# Patient Record
Sex: FEMALE | Race: WHITE
[De-identification: names, ages, dates, MRNs, and addresses within clinical notes are randomized per-mention and may not be internally consistent; named-entity substitution may affect disease eponyms.]

---

## 2021-03-18 ENCOUNTER — HOSPITAL ENCOUNTER (EMERGENCY)
Dept: HOSPITAL 4 - SED | Age: 76
Discharge: HOME | End: 2021-03-18
Payer: COMMERCIAL

## 2021-03-18 VITALS — HEIGHT: 62 IN | BODY MASS INDEX: 36.8 KG/M2 | WEIGHT: 200 LBS

## 2021-03-18 VITALS — SYSTOLIC BLOOD PRESSURE: 127 MMHG

## 2021-03-18 VITALS — SYSTOLIC BLOOD PRESSURE: 156 MMHG

## 2021-03-18 DIAGNOSIS — R10.84: Primary | ICD-10-CM

## 2021-03-18 LAB
ALBUMIN SERPL BCP-MCNC: 4 G/DL (ref 3.4–4.8)
ALT SERPL W P-5'-P-CCNC: 40 U/L (ref 12–78)
ANION GAP SERPL CALCULATED.3IONS-SCNC: 9 MMOL/L (ref 5–15)
AST SERPL W P-5'-P-CCNC: 30 U/L (ref 10–37)
BASOPHILS # BLD AUTO: 0 K/UL (ref 0–0.2)
BASOPHILS NFR BLD AUTO: 0.7 % (ref 0–2)
BILIRUB SERPL-MCNC: 0.4 MG/DL (ref 0–1)
BUN SERPL-MCNC: 9 MG/DL (ref 8–21)
CALCIUM SERPL-MCNC: 10.3 MG/DL (ref 8.4–11)
CHLORIDE SERPL-SCNC: 97 MMOL/L (ref 98–107)
CK MB SERPL-CCNC: 2.5 NG/ML (ref 0–3.6)
CK MB SERPL-RTO: 1 % (ref 0–2.9)
CREAT SERPL-MCNC: 0.99 MG/DL (ref 0.55–1.3)
EOSINOPHIL # BLD AUTO: 0 K/UL (ref 0–0.4)
EOSINOPHIL NFR BLD AUTO: 0.1 % (ref 0–4)
ERYTHROCYTE [DISTWIDTH] IN BLOOD BY AUTOMATED COUNT: 13.4 % (ref 9–15)
GFR SERPL CREATININE-BSD FRML MDRD: (no result) ML/MIN (ref 90–?)
GLUCOSE SERPL-MCNC: 113 MG/DL (ref 70–99)
HCT VFR BLD AUTO: 42.7 % (ref 36–48)
HGB BLD-MCNC: 14.4 G/DL (ref 12–16)
LYMPHOCYTES # BLD AUTO: 0.5 K/UL (ref 1–5.5)
LYMPHOCYTES NFR BLD AUTO: 8.4 % (ref 20.5–51.5)
MCH RBC QN AUTO: 27 PG (ref 27–31)
MCHC RBC AUTO-ENTMCNC: 34 % (ref 32–36)
MCV RBC AUTO: 81 FL (ref 79–98)
MONOCYTES # BLD MANUAL: 0.4 K/UL (ref 0–1)
MONOCYTES # BLD MANUAL: 6.1 % (ref 1.7–9.3)
NEUTROPHILS # BLD AUTO: 5.5 K/UL (ref 1.8–7.7)
NEUTROPHILS NFR BLD AUTO: 84.7 % (ref 40–70)
PLATELET # BLD AUTO: 210 K/UL (ref 130–430)
POTASSIUM SERPL-SCNC: 3.4 MMOL/L (ref 3.5–5.1)
RBC # BLD AUTO: 5.29 MIL/UL (ref 4.2–6.2)
SODIUM SERPLBLD-SCNC: 136 MMOL/L (ref 136–145)
WBC # BLD AUTO: 6.5 K/UL (ref 4.8–10.8)

## 2021-03-18 PROCEDURE — 85025 COMPLETE CBC W/AUTO DIFF WBC: CPT

## 2021-03-18 PROCEDURE — 82553 CREATINE MB FRACTION: CPT

## 2021-03-18 PROCEDURE — 71045 X-RAY EXAM CHEST 1 VIEW: CPT

## 2021-03-18 PROCEDURE — 80053 COMPREHEN METABOLIC PANEL: CPT

## 2021-03-18 PROCEDURE — 84484 ASSAY OF TROPONIN QUANT: CPT

## 2021-03-18 PROCEDURE — 96374 THER/PROPH/DIAG INJ IV PUSH: CPT

## 2021-03-18 PROCEDURE — 82962 GLUCOSE BLOOD TEST: CPT

## 2021-03-18 PROCEDURE — 83880 ASSAY OF NATRIURETIC PEPTIDE: CPT

## 2021-03-18 PROCEDURE — 93005 ELECTROCARDIOGRAM TRACING: CPT

## 2021-03-18 PROCEDURE — 82550 ASSAY OF CK (CPK): CPT

## 2021-03-18 PROCEDURE — 96361 HYDRATE IV INFUSION ADD-ON: CPT

## 2021-03-18 PROCEDURE — 99285 EMERGENCY DEPT VISIT HI MDM: CPT

## 2021-03-18 PROCEDURE — 36415 COLL VENOUS BLD VENIPUNCTURE: CPT

## 2021-03-18 NOTE — NUR
Patient to be transferred to [facility].  Is being transferred due to 
higher level of care.  Receiving facility has accepting physician and available 
space. ER physician has signed transfer form.  Patient or responsible party has 
agreed to transfer and signed form.  Patient belongings inventoried and will be 
sent with patient.  Copy of nursing notes, lab reports, EKG, Physicians Orders 
and X-rays to be sent with patient.  Report called to Rah at receiving 
facility. Receiving physician is Ned. [facility] ambulance service has been called 
for transfer.

## 2021-03-18 NOTE — NUR
**TRANSFER INFO**

[facility] [place] [address]

[phone]

ALS ETA [id number]



Spoke to Maribell

## 2021-03-18 NOTE — NUR
Pt currently resting in bed, denies any pain at this time. Respirations 
unlabored, chest equal rise and fall. In no distress. In stable condition. 

Pending possible [place] transfer.

## 2021-03-18 NOTE — NUR
Pt BIB by ambulance for a syncopal episode. Pt stated that she had bilateral 
upper abdominal pain since Friday and felt dizzy since this this morning. Pt 
stated that she felt that this morning she is feeling dizzy and only ate soup. 
Currently feels that her blood sugar is low. Denies any nausea at this time. 

Pending MD sweeney.

## 2022-03-01 ENCOUNTER — HOSPITAL ENCOUNTER (EMERGENCY)
Dept: HOSPITAL 4 - SED | Age: 77
Discharge: HOME | End: 2022-03-01
Payer: MEDICARE

## 2022-03-01 VITALS — SYSTOLIC BLOOD PRESSURE: 135 MMHG

## 2022-03-01 VITALS — SYSTOLIC BLOOD PRESSURE: 112 MMHG

## 2022-03-01 VITALS — BODY MASS INDEX: 19.29 KG/M2 | HEIGHT: 66 IN | WEIGHT: 120 LBS

## 2022-03-01 DIAGNOSIS — E87.6: Primary | ICD-10-CM

## 2022-03-01 DIAGNOSIS — K59.00: ICD-10-CM

## 2022-03-01 DIAGNOSIS — I10: ICD-10-CM

## 2022-03-01 DIAGNOSIS — Z20.822: ICD-10-CM

## 2022-03-01 DIAGNOSIS — F32.9: ICD-10-CM

## 2022-03-01 LAB
ALBUMIN SERPL BCP-MCNC: 2.9 G/DL (ref 3.4–4.8)
ALT SERPL W P-5'-P-CCNC: 18 U/L (ref 12–78)
ANION GAP SERPL CALCULATED.3IONS-SCNC: 8 MMOL/L (ref 5–15)
APPEARANCE UR: (no result)
AST SERPL W P-5'-P-CCNC: 20 U/L (ref 10–37)
BACTERIA URNS QL MICRO: (no result) /HPF
BASOPHILS # BLD AUTO: 0.1 K/UL (ref 0–0.2)
BASOPHILS NFR BLD AUTO: 1.3 % (ref 0–2)
BILIRUB SERPL-MCNC: 0.2 MG/DL (ref 0–1)
BILIRUB UR QL STRIP: NEGATIVE
BUN SERPL-MCNC: 20 MG/DL (ref 8–21)
CALCIUM SERPL-MCNC: 9 MG/DL (ref 8.4–11)
CHLORIDE SERPL-SCNC: 94 MMOL/L (ref 98–107)
COLOR UR: YELLOW
CREAT SERPL-MCNC: 0.75 MG/DL (ref 0.55–1.3)
EOSINOPHIL # BLD AUTO: 0 K/UL (ref 0–0.4)
EOSINOPHIL NFR BLD AUTO: 0.2 % (ref 0–4)
ERYTHROCYTE [DISTWIDTH] IN BLOOD BY AUTOMATED COUNT: 14.4 % (ref 9–15)
GFR SERPL CREATININE-BSD FRML MDRD: (no result) ML/MIN (ref 90–?)
GLUCOSE SERPL-MCNC: 121 MG/DL (ref 70–99)
GLUCOSE UR STRIP-MCNC: NEGATIVE MG/DL
HCT VFR BLD AUTO: 37.2 % (ref 36–48)
HGB BLD-MCNC: 12.5 G/DL (ref 12–16)
HGB UR QL STRIP: (no result)
KETONES UR STRIP-MCNC: NEGATIVE MG/DL
LEUKOCYTE ESTERASE UR QL STRIP: (no result)
LIPASE SERPL-CCNC: 170 U/L (ref 73–393)
LYMPHOCYTES # BLD AUTO: 1 K/UL (ref 1–5.5)
LYMPHOCYTES NFR BLD AUTO: 14 % (ref 20.5–51.5)
MCH RBC QN AUTO: 27 PG (ref 27–31)
MCHC RBC AUTO-ENTMCNC: 34 % (ref 32–36)
MCV RBC AUTO: 80 FL (ref 79–98)
MONOCYTES # BLD MANUAL: 0.3 K/UL (ref 0–1)
MONOCYTES # BLD MANUAL: 4.4 % (ref 1.7–9.3)
NEUTROPHILS # BLD AUTO: 5.5 K/UL (ref 1.8–7.7)
NEUTROPHILS NFR BLD AUTO: 80.1 % (ref 40–70)
NITRITE UR QL STRIP: NEGATIVE
PH UR STRIP: 5.5 [PH] (ref 5–8)
PLATELET # BLD AUTO: 199 K/UL (ref 130–430)
POTASSIUM SERPL-SCNC: 2.7 MMOL/L (ref 3.5–5.1)
PROT UR QL STRIP: NEGATIVE
RBC # BLD AUTO: 4.68 MIL/UL (ref 4.2–6.2)
SODIUM SERPLBLD-SCNC: 132 MMOL/L (ref 136–145)
SP GR UR STRIP: 1.01 (ref 1–1.03)
UROBILINOGEN UR STRIP-MCNC: 0.2 MG/DL (ref 0.2–1)
WBC # BLD AUTO: 6.9 K/UL (ref 4.8–10.8)

## 2022-03-01 PROCEDURE — 74021 RADEX ABDOMEN 3+ VIEWS: CPT

## 2022-03-01 PROCEDURE — 83735 ASSAY OF MAGNESIUM: CPT

## 2022-03-01 PROCEDURE — 96365 THER/PROPH/DIAG IV INF INIT: CPT

## 2022-03-01 PROCEDURE — 93005 ELECTROCARDIOGRAM TRACING: CPT

## 2022-03-01 PROCEDURE — 87426 SARSCOV CORONAVIRUS AG IA: CPT

## 2022-03-01 PROCEDURE — 36415 COLL VENOUS BLD VENIPUNCTURE: CPT

## 2022-03-01 PROCEDURE — 96372 THER/PROPH/DIAG INJ SC/IM: CPT

## 2022-03-01 PROCEDURE — 99285 EMERGENCY DEPT VISIT HI MDM: CPT

## 2022-03-01 PROCEDURE — 85025 COMPLETE CBC W/AUTO DIFF WBC: CPT

## 2022-03-01 PROCEDURE — 83690 ASSAY OF LIPASE: CPT

## 2022-03-01 PROCEDURE — 87086 URINE CULTURE/COLONY COUNT: CPT

## 2022-03-01 PROCEDURE — 81000 URINALYSIS NONAUTO W/SCOPE: CPT

## 2022-03-01 PROCEDURE — 80053 COMPREHEN METABOLIC PANEL: CPT

## 2022-04-18 ENCOUNTER — HOSPITAL ENCOUNTER (EMERGENCY)
Dept: HOSPITAL 4 - SED | Age: 77
LOS: 1 days | Discharge: HOME | End: 2022-04-19
Payer: MEDICARE

## 2022-04-18 VITALS — SYSTOLIC BLOOD PRESSURE: 137 MMHG

## 2022-04-18 VITALS — WEIGHT: 98 LBS | BODY MASS INDEX: 17.36 KG/M2 | HEIGHT: 63 IN

## 2022-04-18 DIAGNOSIS — F41.9: ICD-10-CM

## 2022-04-18 DIAGNOSIS — I20.9: Primary | ICD-10-CM

## 2022-04-18 DIAGNOSIS — I10: ICD-10-CM

## 2022-04-18 LAB
ALBUMIN SERPL BCP-MCNC: 3.1 G/DL (ref 3.4–4.8)
ALT SERPL W P-5'-P-CCNC: 21 U/L (ref 12–78)
ANION GAP SERPL CALCULATED.3IONS-SCNC: 3 MMOL/L (ref 5–15)
AST SERPL W P-5'-P-CCNC: 14 U/L (ref 10–37)
BASOPHILS # BLD AUTO: 0 K/UL (ref 0–0.2)
BASOPHILS NFR BLD AUTO: 1.2 % (ref 0–2)
BILIRUB SERPL-MCNC: < 0.1 MG/DL (ref 0–1)
BUN SERPL-MCNC: 17 MG/DL (ref 8–21)
CALCIUM SERPL-MCNC: 9.9 MG/DL (ref 8.4–11)
CHLORIDE SERPL-SCNC: 95 MMOL/L (ref 98–107)
CREAT SERPL-MCNC: 0.72 MG/DL (ref 0.55–1.3)
EOSINOPHIL # BLD AUTO: 0 K/UL (ref 0–0.4)
EOSINOPHIL NFR BLD AUTO: 0.5 % (ref 0–4)
ERYTHROCYTE [DISTWIDTH] IN BLOOD BY AUTOMATED COUNT: 14.1 % (ref 9–15)
GFR SERPL CREATININE-BSD FRML MDRD: (no result) ML/MIN (ref 90–?)
GLUCOSE SERPL-MCNC: 107 MG/DL (ref 70–99)
HCT VFR BLD AUTO: 35.2 % (ref 36–48)
HGB BLD-MCNC: 11.9 G/DL (ref 12–16)
LYMPHOCYTES # BLD AUTO: 1.1 K/UL (ref 1–5.5)
LYMPHOCYTES NFR BLD AUTO: 26.6 % (ref 20.5–51.5)
MCH RBC QN AUTO: 28 PG (ref 27–31)
MCHC RBC AUTO-ENTMCNC: 34 % (ref 32–36)
MCV RBC AUTO: 81 FL (ref 79–98)
MONOCYTES # BLD MANUAL: 0.3 K/UL (ref 0–1)
MONOCYTES # BLD MANUAL: 7.6 % (ref 1.7–9.3)
NEUTROPHILS # BLD AUTO: 2.6 K/UL (ref 1.8–7.7)
NEUTROPHILS NFR BLD AUTO: 64.1 % (ref 40–70)
PLATELET # BLD AUTO: 199 K/UL (ref 130–430)
POTASSIUM SERPL-SCNC: 3.5 MMOL/L (ref 3.5–5.1)
RBC # BLD AUTO: 4.33 MIL/UL (ref 4.2–6.2)
SODIUM SERPLBLD-SCNC: 131 MMOL/L (ref 136–145)
WBC # BLD AUTO: 4 K/UL (ref 4.8–10.8)

## 2022-04-18 PROCEDURE — 93005 ELECTROCARDIOGRAM TRACING: CPT

## 2022-04-18 PROCEDURE — 96361 HYDRATE IV INFUSION ADD-ON: CPT

## 2022-04-18 PROCEDURE — 36415 COLL VENOUS BLD VENIPUNCTURE: CPT

## 2022-04-18 PROCEDURE — 72125 CT NECK SPINE W/O DYE: CPT

## 2022-04-18 PROCEDURE — 85025 COMPLETE CBC W/AUTO DIFF WBC: CPT

## 2022-04-18 PROCEDURE — 70450 CT HEAD/BRAIN W/O DYE: CPT

## 2022-04-18 PROCEDURE — 99285 EMERGENCY DEPT VISIT HI MDM: CPT

## 2022-04-18 PROCEDURE — 76376 3D RENDER W/INTRP POSTPROCES: CPT

## 2022-04-18 PROCEDURE — 71045 X-RAY EXAM CHEST 1 VIEW: CPT

## 2022-04-18 PROCEDURE — 83880 ASSAY OF NATRIURETIC PEPTIDE: CPT

## 2022-04-18 PROCEDURE — 80053 COMPREHEN METABOLIC PANEL: CPT

## 2022-04-18 PROCEDURE — 82962 GLUCOSE BLOOD TEST: CPT

## 2022-04-18 PROCEDURE — 96374 THER/PROPH/DIAG INJ IV PUSH: CPT

## 2022-04-18 PROCEDURE — 84484 ASSAY OF TROPONIN QUANT: CPT

## 2022-04-18 NOTE — NUR
PT BIBA FROM HOME FOR SUDDEN ONSET CHEST WALL PAIN THAT STARTED 1 HR PTA. PER 
REPORT , PT SELF MEDICATED WITH 3 NITRO WITH MODERATE RELIEF. PER REPORT SHE 
WAS JUST DISCHARGED FROM [facility] FOR SAME CHEST PAIN COMPLAINT. PT CURRENTLY IN 
RESTROOM, COLLECTING URINE. WAIITNG FOR ER MD MOYA.

## 2022-04-18 NOTE — NUR
PT REPORTS SOB AND "BREATHING FAST" PT IN NO APPARENT DISTRESS, ON RA @100%. 
RESP EVEN AND UNLABORED, YSABEL LS-CLR. DENIES CP OR PRESSURE AT THIS TIME. 
REPORTS FEELING NERVOUS. REQUESTING IV FLUIDS BECAUSE SHE'S TIRED. DR BECKMAN MADE 
AWARE. PLAN OF CARE INFORMED.

## 2022-04-18 NOTE — NUR
PT UP FOR DISCHARGE, PT ASLEEP, RESPONDED TO TACTILE STIMULATION. RESP EVEN AND 
UNLABORED, ON RA @97%. CN RACHEAL INFORMED. WILL HAVE PT SLEEP AT THIS TIME.

## 2022-04-18 NOTE — NUR
PT WITH EYES CLOSED, IN NAD. RESP EVEN AND UNLBAORED, IV FLUIDS INFUSING WELL. 
SAFETY PRCAUTIONS IN PLACE.

## 2022-04-19 VITALS — SYSTOLIC BLOOD PRESSURE: 125 MMHG

## 2022-04-19 NOTE — NUR
Patient given written and verbal discharge instructions and verbalizes 
understanding.  ER MD discussed with patient the results and treatment 
provided. Patient in stable condition. ID arm band removed. IV catheter removed 
intact and dressing applied, no active bleeding. Patient educated on pain 
management and to follow up with PMD. Pain scale 0/10. Opportunity for 
questions provided and answered.

## 2022-04-19 NOTE — NUR
DR WALLACE SPOKE WITH [facility] MD, PT WILL STAY IN ER. PT WITH EYES CLOSED, IN 
NAD RESP EVEN AND UNLABORED, ON RA @98%. SAFETY PRECAUTIONS IN PLACE.

## 2022-04-19 NOTE — NUR
-------------------------------------------------------------------------------

           *** Note antwanone in EDM - 04/19/22 at 0254 by SDEDCJM ***            

-------------------------------------------------------------------------------

LOUD THUMP HEARD FROM NURSING STATION.   THIS RN RAN TO ROOM FOUND PATIENT ON 
FLOOR LAYING ON LEFT SIDE  BY FEET OF BED WITH HER LEFT HAND HOLDING THE BACK 
OF HER HEAD AND NECK. BOTH [facility] SIDE RAILS.  PATIENT STATES " I WANTED TO GET 
UP AND PEE."  DR WALLACE AT BEDSIDE. PATIENT ASSISTED TO RESTROOM  TO URINATE.   
PATIENT VOIDED AND ACCOMPANIED BACK TO BED WITH BOTH SIDE RAILS UP.  PATIENT 
HAS A BUMP TO BACK OF HEAD. IS UNABLE TO AMBULATE WITHOUT ASSIST.

## 2022-04-19 NOTE — NUR
LOUD THUMP HEARD FROM NURSING STATION.   THIS RN RAN TO ROOM FOUND PATIENT ON 
FLOOR LAYING ON LEFT SIDE  BY FEET OF BED WITH HER LEFT HAND HOLDING THE BACK 
OF HER HEAD AND NECK. BOTH GURNEY SIDE RAILS.  PATIENT STATES " I WANTED TO GET 
UP AND PEE."  DR WALLACE AT BEDSIDE. PATIENT ASSISTED TO RESTROOM  TO URINATE.   
PATIENT VOIDED AND ACCOMPANIED BACK TO BED WITH BOTH SIDE RAILS UP.  IS UNABLE 
TO AMBULATE WITHOUT ASSIST.   DENIES ANY PAIN

## 2022-04-19 NOTE — NUR
PT CALLED FOR ASSISTANCE, MIN ASSIST TO BATHROOM, STEADY GAIT. PT VOIDED, 
DENIES CP OR SOB. REQUSTS BREAKFAST IN TH E MORNING BEFORE GOING HOME. VSS. 
WILL CONT TO MONITOR. SAFETY PRECAUTIONS IN PLACE.

## 2022-07-27 ENCOUNTER — HOSPITAL ENCOUNTER (INPATIENT)
Dept: HOSPITAL 4 - SED | Age: 77
LOS: 8 days | Discharge: HOME | DRG: 690 | End: 2022-08-04
Payer: COMMERCIAL

## 2022-07-27 VITALS — SYSTOLIC BLOOD PRESSURE: 116 MMHG

## 2022-07-27 VITALS — SYSTOLIC BLOOD PRESSURE: 156 MMHG

## 2022-07-27 VITALS — WEIGHT: 94 LBS | BODY MASS INDEX: 16.66 KG/M2 | HEIGHT: 63 IN

## 2022-07-27 VITALS — SYSTOLIC BLOOD PRESSURE: 129 MMHG

## 2022-07-27 DIAGNOSIS — Z91.018: ICD-10-CM

## 2022-07-27 DIAGNOSIS — K21.9: ICD-10-CM

## 2022-07-27 DIAGNOSIS — K29.70: ICD-10-CM

## 2022-07-27 DIAGNOSIS — K59.09: ICD-10-CM

## 2022-07-27 DIAGNOSIS — Z86.79: ICD-10-CM

## 2022-07-27 DIAGNOSIS — R62.7: ICD-10-CM

## 2022-07-27 DIAGNOSIS — I10: ICD-10-CM

## 2022-07-27 DIAGNOSIS — E87.1: ICD-10-CM

## 2022-07-27 DIAGNOSIS — R73.03: ICD-10-CM

## 2022-07-27 DIAGNOSIS — N39.0: Primary | ICD-10-CM

## 2022-07-27 DIAGNOSIS — Z20.822: ICD-10-CM

## 2022-07-27 DIAGNOSIS — E87.8: ICD-10-CM

## 2022-07-27 DIAGNOSIS — E44.0: ICD-10-CM

## 2022-07-27 DIAGNOSIS — B95.1: ICD-10-CM

## 2022-07-27 LAB
ALBUMIN SERPL BCP-MCNC: 3.7 G/DL (ref 3.4–4.8)
ALT SERPL W P-5'-P-CCNC: 20 U/L (ref 12–78)
AMPHETAMINES UR QL SCN: NEGATIVE
ANION GAP SERPL CALCULATED.3IONS-SCNC: < 3 MMOL/L (ref 5–15)
APAP SERPL-MCNC: < 1 UG/ML (ref 1–30)
APPEARANCE UR: (no result)
AST SERPL W P-5'-P-CCNC: 19 U/L (ref 10–37)
BACTERIA URNS QL MICRO: (no result) /HPF
BARBITURATES UR QL SCN: NEGATIVE
BASOPHILS # BLD AUTO: 0 K/UL (ref 0–0.2)
BASOPHILS NFR BLD AUTO: 0.8 % (ref 0–2)
BENZODIAZ UR QL SCN: NEGATIVE
BILIRUB SERPL-MCNC: 0.5 MG/DL (ref 0–1)
BILIRUB UR QL STRIP: NEGATIVE
BUN SERPL-MCNC: 15 MG/DL (ref 8–21)
BZE UR QL SCN: NEGATIVE
CALCIUM SERPL-MCNC: 10.1 MG/DL (ref 8.4–11)
CANNABINOIDS UR QL SCN: NEGATIVE
CHLORIDE SERPL-SCNC: 94 MMOL/L (ref 98–107)
COLOR UR: YELLOW
CREAT SERPL-MCNC: 0.72 MG/DL (ref 0.55–1.3)
EOSINOPHIL # BLD AUTO: 0 K/UL (ref 0–0.4)
EOSINOPHIL NFR BLD AUTO: 0.1 % (ref 0–4)
ERYTHROCYTE [DISTWIDTH] IN BLOOD BY AUTOMATED COUNT: 13.4 % (ref 9–15)
ETHANOL SERPL-MCNC: < 3 MG/DL (ref ?–10)
GFR SERPL CREATININE-BSD FRML MDRD: (no result) ML/MIN (ref 90–?)
GLUCOSE SERPL-MCNC: 100 MG/DL (ref 70–99)
GLUCOSE UR STRIP-MCNC: NEGATIVE MG/DL
HCT VFR BLD AUTO: 41.3 % (ref 36–48)
HGB BLD-MCNC: 14.1 G/DL (ref 12–16)
HGB UR QL STRIP: (no result)
INR PPP: 1.2 (ref 0.8–1.2)
KETONES UR STRIP-MCNC: NEGATIVE MG/DL
LEUKOCYTE ESTERASE UR QL STRIP: (no result)
LYMPHOCYTES # BLD AUTO: 0.6 K/UL (ref 1–5.5)
LYMPHOCYTES NFR BLD AUTO: 11.9 % (ref 20.5–51.5)
MCH RBC QN AUTO: 28 PG (ref 27–31)
MCHC RBC AUTO-ENTMCNC: 34 % (ref 32–36)
MCV RBC AUTO: 81 FL (ref 79–98)
METHADONE UR-SCNC: NEGATIVE UMOL/L
METHAMPHET UR-SCNC: NEGATIVE UMOL/L
MONOCYTES # BLD MANUAL: 0.2 K/UL (ref 0–1)
MONOCYTES # BLD MANUAL: 4.2 % (ref 1.7–9.3)
NEUTROPHILS # BLD AUTO: 4.4 K/UL (ref 1.8–7.7)
NEUTROPHILS NFR BLD AUTO: 83 % (ref 40–70)
NITRITE UR QL STRIP: NEGATIVE
OPIATES UR QL SCN: NEGATIVE
OXYCODONE SERPL-MCNC: NEGATIVE NG/ML
PCP UR QL SCN: NEGATIVE
PH UR STRIP: 7 [PH] (ref 5–8)
PLATELET # BLD AUTO: 198 K/UL (ref 130–430)
POTASSIUM SERPL-SCNC: 4.1 MMOL/L (ref 3.5–5.1)
PROT UR QL STRIP: NEGATIVE
PROTHROMBIN TIME: 12.3 SECS (ref 9.5–12.5)
RBC # BLD AUTO: 5.1 MIL/UL (ref 4.2–6.2)
SODIUM SERPLBLD-SCNC: 130 MMOL/L (ref 136–145)
SP GR UR STRIP: 1.01 (ref 1–1.03)
TRICYCLICS UR-MCNC: NEGATIVE NG/ML
URINE PROPOXYPHENE SCREEN: NEGATIVE
UROBILINOGEN UR STRIP-MCNC: 0.2 MG/DL (ref 0.2–1)
WBC # BLD AUTO: 5.3 K/UL (ref 4.8–10.8)
WBC #/AREA URNS HPF: (no result) /HPF (ref 0–3)

## 2022-07-27 PROCEDURE — G0481 DRUG TEST DEF 8-14 CLASSES: HCPCS

## 2022-07-27 PROCEDURE — G0482 DRUG TEST DEF 15-21 CLASSES: HCPCS

## 2022-07-27 PROCEDURE — G0480 DRUG TEST DEF 1-7 CLASSES: HCPCS

## 2022-07-27 PROCEDURE — C9113 INJ PANTOPRAZOLE SODIUM, VIA: HCPCS

## 2022-07-27 RX ADMIN — DEXTROSE MONOHYDRATE SCH MLS/HR: 50 INJECTION, SOLUTION INTRAVENOUS at 21:21

## 2022-07-27 RX ADMIN — ZOLPIDEM TARTRATE PRN MG: 5 TABLET, FILM COATED ORAL at 21:20

## 2022-07-27 RX ADMIN — ONDANSETRON PRN MG: 2 INJECTION INTRAMUSCULAR; INTRAVENOUS at 21:20

## 2022-07-28 VITALS — SYSTOLIC BLOOD PRESSURE: 111 MMHG

## 2022-07-28 VITALS — SYSTOLIC BLOOD PRESSURE: 108 MMHG

## 2022-07-28 VITALS — SYSTOLIC BLOOD PRESSURE: 117 MMHG

## 2022-07-28 VITALS — SYSTOLIC BLOOD PRESSURE: 118 MMHG

## 2022-07-28 LAB
ANION GAP SERPL CALCULATED.3IONS-SCNC: 1 MMOL/L (ref 5–15)
BASOPHILS # BLD AUTO: 0 K/UL (ref 0–0.2)
BASOPHILS NFR BLD AUTO: 1.1 % (ref 0–2)
BUN SERPL-MCNC: 16 MG/DL (ref 8–21)
CALCIUM SERPL-MCNC: 9.6 MG/DL (ref 8.4–11)
CHLORIDE SERPL-SCNC: 94 MMOL/L (ref 98–107)
CREAT SERPL-MCNC: 0.8 MG/DL (ref 0.55–1.3)
EOSINOPHIL # BLD AUTO: 0 K/UL (ref 0–0.4)
EOSINOPHIL NFR BLD AUTO: 0.4 % (ref 0–4)
ERYTHROCYTE [DISTWIDTH] IN BLOOD BY AUTOMATED COUNT: 13.3 % (ref 9–15)
GFR SERPL CREATININE-BSD FRML MDRD: (no result) ML/MIN (ref 90–?)
GLUCOSE SERPL-MCNC: 87 MG/DL (ref 70–99)
HCT VFR BLD AUTO: 36 % (ref 36–48)
HGB BLD-MCNC: 12.4 G/DL (ref 12–16)
LYMPHOCYTES # BLD AUTO: 0.8 K/UL (ref 1–5.5)
LYMPHOCYTES NFR BLD AUTO: 27 % (ref 20.5–51.5)
MCH RBC QN AUTO: 28 PG (ref 27–31)
MCHC RBC AUTO-ENTMCNC: 34 % (ref 32–36)
MCV RBC AUTO: 80 FL (ref 79–98)
MONOCYTES # BLD MANUAL: 0.2 K/UL (ref 0–1)
MONOCYTES # BLD MANUAL: 6.3 % (ref 1.7–9.3)
NEUTROPHILS # BLD AUTO: 2 K/UL (ref 1.8–7.7)
NEUTROPHILS NFR BLD AUTO: 65.2 % (ref 40–70)
PLATELET # BLD AUTO: 182 K/UL (ref 130–430)
POTASSIUM SERPL-SCNC: 3.9 MMOL/L (ref 3.5–5.1)
RBC # BLD AUTO: 4.49 MIL/UL (ref 4.2–6.2)
SODIUM SERPLBLD-SCNC: 129 MMOL/L (ref 136–145)
WBC # BLD AUTO: 3.1 K/UL (ref 4.8–10.8)

## 2022-07-28 RX ADMIN — SODIUM CHLORIDE SCH MG: 9 INJECTION, SOLUTION INTRAVENOUS at 09:00

## 2022-07-28 RX ADMIN — ZOLPIDEM TARTRATE PRN MG: 5 TABLET, FILM COATED ORAL at 21:56

## 2022-07-28 RX ADMIN — ONDANSETRON PRN MG: 2 INJECTION INTRAMUSCULAR; INTRAVENOUS at 21:56

## 2022-07-28 RX ADMIN — DEXTROSE MONOHYDRATE SCH MLS/HR: 50 INJECTION, SOLUTION INTRAVENOUS at 20:32

## 2022-07-29 VITALS — SYSTOLIC BLOOD PRESSURE: 100 MMHG

## 2022-07-29 VITALS — SYSTOLIC BLOOD PRESSURE: 112 MMHG

## 2022-07-29 VITALS — SYSTOLIC BLOOD PRESSURE: 92 MMHG

## 2022-07-29 VITALS — SYSTOLIC BLOOD PRESSURE: 127 MMHG

## 2022-07-29 VITALS — SYSTOLIC BLOOD PRESSURE: 94 MMHG

## 2022-07-29 VITALS — SYSTOLIC BLOOD PRESSURE: 96 MMHG

## 2022-07-29 LAB
ALBUMIN SERPL BCP-MCNC: 2.9 G/DL (ref 3.4–4.8)
ALT SERPL W P-5'-P-CCNC: 20 U/L (ref 12–78)
ANION GAP SERPL CALCULATED.3IONS-SCNC: 3 MMOL/L (ref 5–15)
AST SERPL W P-5'-P-CCNC: 15 U/L (ref 10–37)
BASOPHILS # BLD AUTO: 0 K/UL (ref 0–0.2)
BASOPHILS NFR BLD AUTO: 0.5 % (ref 0–2)
BILIRUB SERPL-MCNC: 0.3 MG/DL (ref 0–1)
BUN SERPL-MCNC: 25 MG/DL (ref 8–21)
CALCIUM SERPL-MCNC: 10.1 MG/DL (ref 8.4–11)
CHLORIDE SERPL-SCNC: 92 MMOL/L (ref 98–107)
CREAT SERPL-MCNC: 0.99 MG/DL (ref 0.55–1.3)
EOSINOPHIL # BLD AUTO: 0 K/UL (ref 0–0.4)
EOSINOPHIL NFR BLD AUTO: 0 % (ref 0–4)
ERYTHROCYTE [DISTWIDTH] IN BLOOD BY AUTOMATED COUNT: 13.3 % (ref 9–15)
GFR SERPL CREATININE-BSD FRML MDRD: (no result) ML/MIN (ref 90–?)
GLUCOSE SERPL-MCNC: 114 MG/DL (ref 70–99)
HCT VFR BLD AUTO: 36.6 % (ref 36–48)
HGB BLD-MCNC: 12.9 G/DL (ref 12–16)
INR PPP: 1.2 (ref 0.8–1.2)
LIPASE SERPL-CCNC: 145 U/L (ref 73–393)
LYMPHOCYTES # BLD AUTO: 0.8 K/UL (ref 1–5.5)
LYMPHOCYTES NFR BLD AUTO: 16.3 % (ref 20.5–51.5)
MCH RBC QN AUTO: 28 PG (ref 27–31)
MCHC RBC AUTO-ENTMCNC: 35 % (ref 32–36)
MCV RBC AUTO: 80 FL (ref 79–98)
MONOCYTES # BLD MANUAL: 0.2 K/UL (ref 0–1)
MONOCYTES # BLD MANUAL: 4.5 % (ref 1.7–9.3)
NEUTROPHILS # BLD AUTO: 3.8 K/UL (ref 1.8–7.7)
NEUTROPHILS NFR BLD AUTO: 78.7 % (ref 40–70)
PLATELET # BLD AUTO: 188 K/UL (ref 130–430)
POTASSIUM SERPL-SCNC: 4 MMOL/L (ref 3.5–5.1)
PROTHROMBIN TIME: 12.4 SECS (ref 9.5–12.5)
RBC # BLD AUTO: 4.58 MIL/UL (ref 4.2–6.2)
SODIUM SERPLBLD-SCNC: 129 MMOL/L (ref 136–145)
WBC # BLD AUTO: 4.9 K/UL (ref 4.8–10.8)

## 2022-07-29 PROCEDURE — 0DB98ZX EXCISION OF DUODENUM, VIA NATURAL OR ARTIFICIAL OPENING ENDOSCOPIC, DIAGNOSTIC: ICD-10-PCS | Performed by: INTERNAL MEDICINE

## 2022-07-29 PROCEDURE — 0DB78ZX EXCISION OF STOMACH, PYLORUS, VIA NATURAL OR ARTIFICIAL OPENING ENDOSCOPIC, DIAGNOSTIC: ICD-10-PCS | Performed by: INTERNAL MEDICINE

## 2022-07-29 RX ADMIN — DEXTROSE MONOHYDRATE SCH MLS/HR: 50 INJECTION, SOLUTION INTRAVENOUS at 19:06

## 2022-07-29 RX ADMIN — MIDAZOLAM HYDROCHLORIDE ONE MG: 1 INJECTION, SOLUTION INTRAMUSCULAR; INTRAVENOUS at 07:23

## 2022-07-29 RX ADMIN — ZOLPIDEM TARTRATE PRN MG: 5 TABLET, FILM COATED ORAL at 22:33

## 2022-07-29 RX ADMIN — MEPERIDINE HYDROCHLORIDE ONE MG: 100 INJECTION INTRAMUSCULAR; INTRAVENOUS; SUBCUTANEOUS at 07:23

## 2022-07-29 RX ADMIN — MIDAZOLAM HYDROCHLORIDE ONE MG: 1 INJECTION, SOLUTION INTRAMUSCULAR; INTRAVENOUS at 07:21

## 2022-07-29 RX ADMIN — SODIUM CHLORIDE SCH MG: 9 INJECTION, SOLUTION INTRAVENOUS at 09:37

## 2022-07-29 RX ADMIN — MIDAZOLAM HYDROCHLORIDE ONE MG: 1 INJECTION, SOLUTION INTRAMUSCULAR; INTRAVENOUS at 07:25

## 2022-07-29 RX ADMIN — MEPERIDINE HYDROCHLORIDE ONE MG: 100 INJECTION INTRAMUSCULAR; INTRAVENOUS; SUBCUTANEOUS at 07:21

## 2022-07-30 VITALS — SYSTOLIC BLOOD PRESSURE: 114 MMHG

## 2022-07-30 VITALS — SYSTOLIC BLOOD PRESSURE: 110 MMHG

## 2022-07-30 LAB
AFP-TM SERPL-MCNC: 1 NG/ML (ref 0–9.2)
ANION GAP SERPL CALCULATED.3IONS-SCNC: 3 MMOL/L (ref 5–15)
BASOPHILS # BLD AUTO: 0 K/UL (ref 0–0.2)
BASOPHILS NFR BLD AUTO: 0.8 % (ref 0–2)
BUN SERPL-MCNC: 18 MG/DL (ref 8–21)
CALCIUM SERPL-MCNC: 9.3 MG/DL (ref 8.4–11)
CANCER AG19-9 SERPL-ACNC: 18 U/ML (ref 0–35)
CEA SERPL-MCNC: 1.8 NG/ML (ref 0–4.7)
CHLORIDE SERPL-SCNC: 94 MMOL/L (ref 98–107)
CREAT SERPL-MCNC: 0.74 MG/DL (ref 0.55–1.3)
EOSINOPHIL # BLD AUTO: 0 K/UL (ref 0–0.4)
EOSINOPHIL NFR BLD AUTO: 0.1 % (ref 0–4)
ERYTHROCYTE [DISTWIDTH] IN BLOOD BY AUTOMATED COUNT: 13.4 % (ref 9–15)
GFR SERPL CREATININE-BSD FRML MDRD: (no result) ML/MIN (ref 90–?)
GLUCOSE SERPL-MCNC: 88 MG/DL (ref 70–99)
HCT VFR BLD AUTO: 36.9 % (ref 36–48)
HGB BLD-MCNC: 12.8 G/DL (ref 12–16)
LYMPHOCYTES # BLD AUTO: 0.9 K/UL (ref 1–5.5)
LYMPHOCYTES NFR BLD AUTO: 20 % (ref 20.5–51.5)
MCH RBC QN AUTO: 28 PG (ref 27–31)
MCHC RBC AUTO-ENTMCNC: 35 % (ref 32–36)
MCV RBC AUTO: 80 FL (ref 79–98)
MONOCYTES # BLD MANUAL: 0.3 K/UL (ref 0–1)
MONOCYTES # BLD MANUAL: 5.6 % (ref 1.7–9.3)
NEUTROPHILS # BLD AUTO: 3.4 K/UL (ref 1.8–7.7)
NEUTROPHILS NFR BLD AUTO: 73.5 % (ref 40–70)
PLATELET # BLD AUTO: 188 K/UL (ref 130–430)
POTASSIUM SERPL-SCNC: 3.7 MMOL/L (ref 3.5–5.1)
RBC # BLD AUTO: 4.62 MIL/UL (ref 4.2–6.2)
SODIUM SERPLBLD-SCNC: 131 MMOL/L (ref 136–145)
WBC # BLD AUTO: 4.6 K/UL (ref 4.8–10.8)

## 2022-07-30 RX ADMIN — SODIUM CHLORIDE SCH MLS/HR: 9 INJECTION, SOLUTION INTRAVENOUS at 16:00

## 2022-07-30 RX ADMIN — DOCUSATE SODIUM SCH MG: 100 CAPSULE, LIQUID FILLED ORAL at 21:55

## 2022-07-30 RX ADMIN — SODIUM CHLORIDE SCH MG: 9 INJECTION, SOLUTION INTRAVENOUS at 08:43

## 2022-07-30 RX ADMIN — LACTULOSE SCH ML: 10 SOLUTION ORAL; RECTAL at 08:42

## 2022-07-30 RX ADMIN — SODIUM CHLORIDE SCH MLS/HR: 9 INJECTION, SOLUTION INTRAVENOUS at 08:43

## 2022-07-30 RX ADMIN — HYDROCORTISONE SCH GM: 25 CREAM TOPICAL at 21:56

## 2022-07-30 RX ADMIN — LACTULOSE SCH ML: 10 SOLUTION ORAL; RECTAL at 21:55

## 2022-07-30 RX ADMIN — SODIUM CHLORIDE SCH MLS/HR: 9 INJECTION, SOLUTION INTRAVENOUS at 23:07

## 2022-07-30 RX ADMIN — HYDROCORTISONE SCH GM: 25 CREAM TOPICAL at 09:00

## 2022-07-30 RX ADMIN — DOCUSATE SODIUM SCH MG: 100 CAPSULE, LIQUID FILLED ORAL at 08:42

## 2022-07-30 RX ADMIN — ZOLPIDEM TARTRATE PRN MG: 5 TABLET, FILM COATED ORAL at 23:07

## 2022-07-30 RX ADMIN — Medication SCH GM: at 21:55

## 2022-07-31 VITALS — SYSTOLIC BLOOD PRESSURE: 115 MMHG

## 2022-07-31 VITALS — SYSTOLIC BLOOD PRESSURE: 114 MMHG

## 2022-07-31 VITALS — SYSTOLIC BLOOD PRESSURE: 116 MMHG

## 2022-07-31 VITALS — SYSTOLIC BLOOD PRESSURE: 148 MMHG

## 2022-07-31 VITALS — SYSTOLIC BLOOD PRESSURE: 120 MMHG

## 2022-07-31 LAB
AFP-TM SERPL-MCNC: 1 NG/ML (ref 0–9.2)
ANION GAP SERPL CALCULATED.3IONS-SCNC: 3 MMOL/L (ref 5–15)
BASOPHILS # BLD AUTO: 0 K/UL (ref 0–0.2)
BASOPHILS NFR BLD AUTO: 0.8 % (ref 0–2)
BUN SERPL-MCNC: 16 MG/DL (ref 8–21)
CALCIUM SERPL-MCNC: 9.3 MG/DL (ref 8.4–11)
CEA SERPL-MCNC: 1.8 NG/ML (ref 0–4.7)
CHLORIDE SERPL-SCNC: 92 MMOL/L (ref 98–107)
CREAT SERPL-MCNC: 0.7 MG/DL (ref 0.55–1.3)
EOSINOPHIL # BLD AUTO: 0 K/UL (ref 0–0.4)
EOSINOPHIL NFR BLD AUTO: 0.8 % (ref 0–4)
ERYTHROCYTE [DISTWIDTH] IN BLOOD BY AUTOMATED COUNT: 13.7 % (ref 9–15)
GFR SERPL CREATININE-BSD FRML MDRD: (no result) ML/MIN (ref 90–?)
GLUCOSE SERPL-MCNC: 86 MG/DL (ref 70–99)
HCT VFR BLD AUTO: 36.7 % (ref 36–48)
HGB BLD-MCNC: 12.6 G/DL (ref 12–16)
LYMPHOCYTES # BLD AUTO: 1.1 K/UL (ref 1–5.5)
LYMPHOCYTES NFR BLD AUTO: 25.3 % (ref 20.5–51.5)
MCH RBC QN AUTO: 28 PG (ref 27–31)
MCHC RBC AUTO-ENTMCNC: 34 % (ref 32–36)
MCV RBC AUTO: 80 FL (ref 79–98)
MONOCYTES # BLD MANUAL: 0.3 K/UL (ref 0–1)
MONOCYTES # BLD MANUAL: 6.8 % (ref 1.7–9.3)
NEUTROPHILS # BLD AUTO: 3 K/UL (ref 1.8–7.7)
NEUTROPHILS NFR BLD AUTO: 66.3 % (ref 40–70)
PLATELET # BLD AUTO: 195 K/UL (ref 130–430)
POTASSIUM SERPL-SCNC: 3.5 MMOL/L (ref 3.5–5.1)
RBC # BLD AUTO: 4.57 MIL/UL (ref 4.2–6.2)
SODIUM SERPLBLD-SCNC: 129 MMOL/L (ref 136–145)
WBC # BLD AUTO: 4.5 K/UL (ref 4.8–10.8)

## 2022-07-31 RX ADMIN — ZOLPIDEM TARTRATE PRN MG: 5 TABLET, FILM COATED ORAL at 21:24

## 2022-07-31 RX ADMIN — HYDROCORTISONE SCH GM: 25 CREAM TOPICAL at 10:13

## 2022-07-31 RX ADMIN — DOCUSATE SODIUM SCH MG: 100 CAPSULE, LIQUID FILLED ORAL at 20:34

## 2022-07-31 RX ADMIN — LACTULOSE SCH ML: 10 SOLUTION ORAL; RECTAL at 08:55

## 2022-07-31 RX ADMIN — SODIUM CHLORIDE SCH MG: 9 INJECTION, SOLUTION INTRAVENOUS at 08:55

## 2022-07-31 RX ADMIN — SODIUM CHLORIDE SCH MLS/HR: 9 INJECTION, SOLUTION INTRAVENOUS at 08:56

## 2022-07-31 RX ADMIN — DOCUSATE SODIUM SCH MG: 100 CAPSULE, LIQUID FILLED ORAL at 08:56

## 2022-07-31 RX ADMIN — HYDROCORTISONE SCH GM: 25 CREAM TOPICAL at 21:24

## 2022-07-31 RX ADMIN — SODIUM CHLORIDE SCH MLS/HR: 9 INJECTION, SOLUTION INTRAVENOUS at 15:14

## 2022-07-31 RX ADMIN — Medication SCH GM: at 20:34

## 2022-07-31 RX ADMIN — SIMETHICONE CHEW TAB 80 MG SCH MG: 80 TABLET ORAL at 15:14

## 2022-07-31 RX ADMIN — SIMETHICONE CHEW TAB 80 MG SCH MG: 80 TABLET ORAL at 21:24

## 2022-07-31 RX ADMIN — Medication SCH GM: at 08:57

## 2022-08-01 VITALS — SYSTOLIC BLOOD PRESSURE: 128 MMHG

## 2022-08-01 VITALS — SYSTOLIC BLOOD PRESSURE: 132 MMHG

## 2022-08-01 LAB
ANION GAP SERPL CALCULATED.3IONS-SCNC: 3 MMOL/L (ref 5–15)
BASOPHILS # BLD AUTO: 0 K/UL (ref 0–0.2)
BASOPHILS NFR BLD AUTO: 0.4 % (ref 0–2)
BUN SERPL-MCNC: 19 MG/DL (ref 8–21)
CALCIUM SERPL-MCNC: 9.2 MG/DL (ref 8.4–11)
CHLORIDE SERPL-SCNC: 96 MMOL/L (ref 98–107)
CREAT SERPL-MCNC: 0.7 MG/DL (ref 0.55–1.3)
EOSINOPHIL # BLD AUTO: 0.1 K/UL (ref 0–0.4)
EOSINOPHIL NFR BLD AUTO: 1.1 % (ref 0–4)
ERYTHROCYTE [DISTWIDTH] IN BLOOD BY AUTOMATED COUNT: 13.6 % (ref 9–15)
GFR SERPL CREATININE-BSD FRML MDRD: (no result) ML/MIN (ref 90–?)
GLUCOSE SERPL-MCNC: 97 MG/DL (ref 70–99)
HCT VFR BLD AUTO: 36.4 % (ref 36–48)
HGB BLD-MCNC: 12.6 G/DL (ref 12–16)
LYMPHOCYTES # BLD AUTO: 0.8 K/UL (ref 1–5.5)
LYMPHOCYTES NFR BLD AUTO: 24.9 % (ref 20.5–51.5)
MCH RBC QN AUTO: 28 PG (ref 27–31)
MCHC RBC AUTO-ENTMCNC: 35 % (ref 32–36)
MCV RBC AUTO: 80 FL (ref 79–98)
MONOCYTES # BLD MANUAL: 0.2 K/UL (ref 0–1)
MONOCYTES # BLD MANUAL: 6.2 % (ref 1.7–9.3)
NEUTROPHILS # BLD AUTO: 2.3 K/UL (ref 1.8–7.7)
NEUTROPHILS NFR BLD AUTO: 67.4 % (ref 40–70)
PLATELET # BLD AUTO: 197 K/UL (ref 130–430)
POTASSIUM SERPL-SCNC: 2.9 MMOL/L (ref 3.5–5.1)
RBC # BLD AUTO: 4.58 MIL/UL (ref 4.2–6.2)
SODIUM SERPLBLD-SCNC: 135 MMOL/L (ref 136–145)
WBC # BLD AUTO: 3.4 K/UL (ref 4.8–10.8)

## 2022-08-01 RX ADMIN — DOCUSATE SODIUM SCH MG: 100 CAPSULE, LIQUID FILLED ORAL at 09:00

## 2022-08-01 RX ADMIN — SODIUM CHLORIDE SCH MLS/HR: 9 INJECTION, SOLUTION INTRAVENOUS at 11:57

## 2022-08-01 RX ADMIN — DOCUSATE SODIUM SCH MG: 100 CAPSULE, LIQUID FILLED ORAL at 20:12

## 2022-08-01 RX ADMIN — HYDROCORTISONE SCH GM: 25 CREAM TOPICAL at 20:13

## 2022-08-01 RX ADMIN — SODIUM CHLORIDE SCH MLS/HR: 9 INJECTION, SOLUTION INTRAVENOUS at 09:47

## 2022-08-01 RX ADMIN — SODIUM CHLORIDE SCH MLS/HR: 9 INJECTION, SOLUTION INTRAVENOUS at 22:42

## 2022-08-01 RX ADMIN — SODIUM CHLORIDE SCH MLS/HR: 9 INJECTION, SOLUTION INTRAVENOUS at 00:14

## 2022-08-01 RX ADMIN — SODIUM CHLORIDE SCH MG: 9 INJECTION, SOLUTION INTRAVENOUS at 09:41

## 2022-08-01 RX ADMIN — ZOLPIDEM TARTRATE PRN MG: 5 TABLET, FILM COATED ORAL at 21:36

## 2022-08-01 RX ADMIN — POTASSIUM CHLORIDE PRN MEQ: 20 TABLET, EXTENDED RELEASE ORAL at 11:36

## 2022-08-01 RX ADMIN — SIMETHICONE CHEW TAB 80 MG SCH MG: 80 TABLET ORAL at 09:42

## 2022-08-01 RX ADMIN — HYDROCORTISONE SCH GM: 25 CREAM TOPICAL at 09:42

## 2022-08-01 RX ADMIN — SODIUM CHLORIDE SCH MLS/HR: 9 INJECTION, SOLUTION INTRAVENOUS at 16:28

## 2022-08-02 VITALS — SYSTOLIC BLOOD PRESSURE: 124 MMHG

## 2022-08-02 VITALS — SYSTOLIC BLOOD PRESSURE: 132 MMHG

## 2022-08-02 LAB
ANION GAP SERPL CALCULATED.3IONS-SCNC: 4 MMOL/L (ref 5–15)
BUN SERPL-MCNC: 19 MG/DL (ref 8–21)
CALCIUM SERPL-MCNC: 8.8 MG/DL (ref 8.4–11)
CHLORIDE SERPL-SCNC: 97 MMOL/L (ref 98–107)
CREAT SERPL-MCNC: 0.7 MG/DL (ref 0.55–1.3)
GFR SERPL CREATININE-BSD FRML MDRD: (no result) ML/MIN (ref 90–?)
GLUCOSE SERPL-MCNC: 104 MG/DL (ref 70–99)
POTASSIUM SERPL-SCNC: 3.5 MMOL/L (ref 3.5–5.1)
SODIUM SERPLBLD-SCNC: 135 MMOL/L (ref 136–145)

## 2022-08-02 RX ADMIN — SODIUM CHLORIDE SCH MLS/HR: 9 INJECTION, SOLUTION INTRAVENOUS at 05:11

## 2022-08-02 RX ADMIN — SIMETHICONE CHEW TAB 80 MG SCH MG: 80 TABLET ORAL at 20:53

## 2022-08-02 RX ADMIN — HYDROCORTISONE SCH GM: 25 CREAM TOPICAL at 20:54

## 2022-08-02 RX ADMIN — ZOLPIDEM TARTRATE PRN MG: 5 TABLET, FILM COATED ORAL at 20:58

## 2022-08-02 RX ADMIN — SIMETHICONE CHEW TAB 80 MG SCH MG: 80 TABLET ORAL at 17:20

## 2022-08-02 RX ADMIN — SODIUM CHLORIDE SCH MLS/HR: 9 INJECTION, SOLUTION INTRAVENOUS at 17:45

## 2022-08-02 RX ADMIN — HYDROCORTISONE SCH GM: 25 CREAM TOPICAL at 09:51

## 2022-08-02 RX ADMIN — SODIUM CHLORIDE SCH MG: 9 INJECTION, SOLUTION INTRAVENOUS at 09:50

## 2022-08-02 RX ADMIN — SODIUM CHLORIDE SCH MLS/HR: 9 INJECTION, SOLUTION INTRAVENOUS at 09:50

## 2022-08-02 RX ADMIN — DOCUSATE SODIUM SCH MG: 100 CAPSULE, LIQUID FILLED ORAL at 20:54

## 2022-08-02 RX ADMIN — DOCUSATE SODIUM SCH MG: 100 CAPSULE, LIQUID FILLED ORAL at 09:00

## 2022-08-02 RX ADMIN — SODIUM CHLORIDE SCH MLS/HR: 9 INJECTION, SOLUTION INTRAVENOUS at 17:21

## 2022-08-02 RX ADMIN — SODIUM CHLORIDE SCH MLS/HR: 9 INJECTION, SOLUTION INTRAVENOUS at 01:42

## 2022-08-03 VITALS — SYSTOLIC BLOOD PRESSURE: 138 MMHG

## 2022-08-03 VITALS — SYSTOLIC BLOOD PRESSURE: 136 MMHG

## 2022-08-03 VITALS — SYSTOLIC BLOOD PRESSURE: 132 MMHG

## 2022-08-03 VITALS — SYSTOLIC BLOOD PRESSURE: 135 MMHG

## 2022-08-03 LAB
ANION GAP SERPL CALCULATED.3IONS-SCNC: 6 MMOL/L (ref 5–15)
BASOPHILS # BLD AUTO: 0.1 K/UL (ref 0–0.2)
BASOPHILS NFR BLD AUTO: 1.3 % (ref 0–2)
BUN SERPL-MCNC: 17 MG/DL (ref 8–21)
CALCIUM SERPL-MCNC: 8.8 MG/DL (ref 8.4–11)
CHLORIDE SERPL-SCNC: 100 MMOL/L (ref 98–107)
CREAT SERPL-MCNC: 0.52 MG/DL (ref 0.55–1.3)
EOSINOPHIL # BLD AUTO: 0 K/UL (ref 0–0.4)
EOSINOPHIL NFR BLD AUTO: 0.5 % (ref 0–4)
ERYTHROCYTE [DISTWIDTH] IN BLOOD BY AUTOMATED COUNT: 13.7 % (ref 9–15)
GFR SERPL CREATININE-BSD FRML MDRD: (no result) ML/MIN (ref 90–?)
GLUCOSE SERPL-MCNC: 91 MG/DL (ref 70–99)
HCT VFR BLD AUTO: 34.6 % (ref 36–48)
HGB BLD-MCNC: 12 G/DL (ref 12–16)
LYMPHOCYTES # BLD AUTO: 0.7 K/UL (ref 1–5.5)
LYMPHOCYTES NFR BLD AUTO: 18 % (ref 20.5–51.5)
MCH RBC QN AUTO: 28 PG (ref 27–31)
MCHC RBC AUTO-ENTMCNC: 35 % (ref 32–36)
MCV RBC AUTO: 80 FL (ref 79–98)
MONOCYTES # BLD MANUAL: 0.3 K/UL (ref 0–1)
MONOCYTES # BLD MANUAL: 7 % (ref 1.7–9.3)
NEUTROPHILS # BLD AUTO: 2.9 K/UL (ref 1.8–7.7)
NEUTROPHILS NFR BLD AUTO: 73.2 % (ref 40–70)
PLATELET # BLD AUTO: 193 K/UL (ref 130–430)
POTASSIUM SERPL-SCNC: 2.7 MMOL/L (ref 3.5–5.1)
RBC # BLD AUTO: 4.34 MIL/UL (ref 4.2–6.2)
SODIUM SERPLBLD-SCNC: 136 MMOL/L (ref 136–145)
WBC # BLD AUTO: 4 K/UL (ref 4.8–10.8)

## 2022-08-03 RX ADMIN — SODIUM CHLORIDE SCH MLS/HR: 9 INJECTION, SOLUTION INTRAVENOUS at 23:20

## 2022-08-03 RX ADMIN — SIMETHICONE CHEW TAB 80 MG SCH MG: 80 TABLET ORAL at 08:53

## 2022-08-03 RX ADMIN — HYDROCORTISONE SCH GM: 25 CREAM TOPICAL at 12:03

## 2022-08-03 RX ADMIN — SODIUM CHLORIDE SCH MLS/HR: 9 INJECTION, SOLUTION INTRAVENOUS at 08:53

## 2022-08-03 RX ADMIN — SIMETHICONE CHEW TAB 80 MG SCH MG: 80 TABLET ORAL at 20:31

## 2022-08-03 RX ADMIN — SODIUM CHLORIDE SCH MLS/HR: 9 INJECTION, SOLUTION INTRAVENOUS at 04:09

## 2022-08-03 RX ADMIN — POTASSIUM CHLORIDE PRN MEQ: 20 TABLET, EXTENDED RELEASE ORAL at 12:03

## 2022-08-03 RX ADMIN — SODIUM CHLORIDE AND POTASSIUM CHLORIDE SCH MLS/HR: .9; .15 SOLUTION INTRAVENOUS at 23:05

## 2022-08-03 RX ADMIN — SIMETHICONE CHEW TAB 80 MG SCH MG: 80 TABLET ORAL at 14:01

## 2022-08-03 RX ADMIN — SODIUM CHLORIDE SCH MLS/HR: 9 INJECTION, SOLUTION INTRAVENOUS at 00:13

## 2022-08-03 RX ADMIN — SODIUM CHLORIDE AND POTASSIUM CHLORIDE SCH MLS/HR: .9; .15 SOLUTION INTRAVENOUS at 15:00

## 2022-08-03 RX ADMIN — SODIUM CHLORIDE SCH MG: 9 INJECTION, SOLUTION INTRAVENOUS at 08:53

## 2022-08-03 RX ADMIN — SODIUM CHLORIDE SCH MLS/HR: 9 INJECTION, SOLUTION INTRAVENOUS at 15:47

## 2022-08-03 RX ADMIN — DOCUSATE SODIUM SCH MG: 100 CAPSULE, LIQUID FILLED ORAL at 08:53

## 2022-08-03 RX ADMIN — DOCUSATE SODIUM SCH MG: 100 CAPSULE, LIQUID FILLED ORAL at 20:31

## 2022-08-03 RX ADMIN — HYDROCORTISONE SCH GM: 25 CREAM TOPICAL at 20:33

## 2022-08-04 VITALS — SYSTOLIC BLOOD PRESSURE: 130 MMHG

## 2022-08-04 VITALS — SYSTOLIC BLOOD PRESSURE: 132 MMHG

## 2022-08-04 VITALS — SYSTOLIC BLOOD PRESSURE: 116 MMHG

## 2022-08-04 VITALS — SYSTOLIC BLOOD PRESSURE: 128 MMHG

## 2022-08-04 VITALS — SYSTOLIC BLOOD PRESSURE: 134 MMHG

## 2022-08-04 LAB
ANION GAP SERPL CALCULATED.3IONS-SCNC: 7 MMOL/L (ref 5–15)
BASOPHILS # BLD AUTO: 0 K/UL (ref 0–0.2)
BASOPHILS NFR BLD AUTO: 0.4 % (ref 0–2)
BUN SERPL-MCNC: 17 MG/DL (ref 8–21)
CALCIUM SERPL-MCNC: 9.1 MG/DL (ref 8.4–11)
CHLORIDE SERPL-SCNC: 102 MMOL/L (ref 98–107)
CREAT SERPL-MCNC: 0.54 MG/DL (ref 0.55–1.3)
EOSINOPHIL # BLD AUTO: 0.1 K/UL (ref 0–0.4)
EOSINOPHIL NFR BLD AUTO: 2 % (ref 0–4)
ERYTHROCYTE [DISTWIDTH] IN BLOOD BY AUTOMATED COUNT: 13.8 % (ref 9–15)
GFR SERPL CREATININE-BSD FRML MDRD: (no result) ML/MIN (ref 90–?)
GLUCOSE SERPL-MCNC: 85 MG/DL (ref 70–99)
HCT VFR BLD AUTO: 35.5 % (ref 36–48)
HGB BLD-MCNC: 12.2 G/DL (ref 12–16)
LYMPHOCYTES # BLD AUTO: 0.8 K/UL (ref 1–5.5)
LYMPHOCYTES NFR BLD AUTO: 24 % (ref 20.5–51.5)
MCH RBC QN AUTO: 28 PG (ref 27–31)
MCHC RBC AUTO-ENTMCNC: 34 % (ref 32–36)
MCV RBC AUTO: 81 FL (ref 79–98)
MONOCYTES # BLD MANUAL: 0.2 K/UL (ref 0–1)
MONOCYTES # BLD MANUAL: 6.5 % (ref 1.7–9.3)
NEUTROPHILS # BLD AUTO: 2.4 K/UL (ref 1.8–7.7)
NEUTROPHILS NFR BLD AUTO: 67.1 % (ref 40–70)
PLATELET # BLD AUTO: 201 K/UL (ref 130–430)
POTASSIUM SERPL-SCNC: 3.2 MMOL/L (ref 3.5–5.1)
RBC # BLD AUTO: 4.38 MIL/UL (ref 4.2–6.2)
SODIUM SERPLBLD-SCNC: 139 MMOL/L (ref 136–145)
WBC # BLD AUTO: 3.5 K/UL (ref 4.8–10.8)

## 2022-08-04 RX ADMIN — SODIUM CHLORIDE SCH MG: 9 INJECTION, SOLUTION INTRAVENOUS at 08:28

## 2022-08-04 RX ADMIN — SODIUM CHLORIDE AND POTASSIUM CHLORIDE SCH MLS/HR: .9; .15 SOLUTION INTRAVENOUS at 09:15

## 2022-08-04 RX ADMIN — SODIUM CHLORIDE SCH MLS/HR: 9 INJECTION, SOLUTION INTRAVENOUS at 16:10

## 2022-08-04 RX ADMIN — DOCUSATE SODIUM SCH MG: 100 CAPSULE, LIQUID FILLED ORAL at 08:29

## 2022-08-04 RX ADMIN — SODIUM CHLORIDE SCH MLS/HR: 9 INJECTION, SOLUTION INTRAVENOUS at 08:29

## 2022-08-04 RX ADMIN — SIMETHICONE CHEW TAB 80 MG SCH MG: 80 TABLET ORAL at 15:04

## 2022-08-04 RX ADMIN — HYDROCORTISONE SCH GM: 25 CREAM TOPICAL at 08:30

## 2022-08-04 RX ADMIN — ZOLPIDEM TARTRATE PRN MG: 5 TABLET, FILM COATED ORAL at 00:49

## 2022-08-04 RX ADMIN — SIMETHICONE CHEW TAB 80 MG SCH MG: 80 TABLET ORAL at 08:29

## 2022-09-22 ENCOUNTER — HOSPITAL ENCOUNTER (INPATIENT)
Dept: HOSPITAL 4 - SED | Age: 77
LOS: 6 days | Discharge: HOME | DRG: 760 | End: 2022-09-28
Attending: INTERNAL MEDICINE | Admitting: INTERNAL MEDICINE
Payer: COMMERCIAL

## 2022-09-22 VITALS — SYSTOLIC BLOOD PRESSURE: 105 MMHG

## 2022-09-22 VITALS — SYSTOLIC BLOOD PRESSURE: 160 MMHG

## 2022-09-22 VITALS — WEIGHT: 93 LBS | HEIGHT: 63 IN | BODY MASS INDEX: 16.48 KG/M2

## 2022-09-22 VITALS — SYSTOLIC BLOOD PRESSURE: 111 MMHG

## 2022-09-22 DIAGNOSIS — Z88.8: ICD-10-CM

## 2022-09-22 DIAGNOSIS — K59.09: ICD-10-CM

## 2022-09-22 DIAGNOSIS — D72.819: ICD-10-CM

## 2022-09-22 DIAGNOSIS — K29.70: ICD-10-CM

## 2022-09-22 DIAGNOSIS — E44.1: ICD-10-CM

## 2022-09-22 DIAGNOSIS — Z20.822: ICD-10-CM

## 2022-09-22 DIAGNOSIS — E87.6: ICD-10-CM

## 2022-09-22 DIAGNOSIS — N81.6: ICD-10-CM

## 2022-09-22 DIAGNOSIS — I25.10: ICD-10-CM

## 2022-09-22 DIAGNOSIS — N39.0: ICD-10-CM

## 2022-09-22 DIAGNOSIS — Z91.018: ICD-10-CM

## 2022-09-22 DIAGNOSIS — E87.1: ICD-10-CM

## 2022-09-22 DIAGNOSIS — N81.10: Primary | ICD-10-CM

## 2022-09-22 LAB
ALBUMIN SERPL BCP-MCNC: 3.6 G/DL (ref 3.4–4.8)
ALT SERPL W P-5'-P-CCNC: 22 U/L (ref 12–78)
ANION GAP SERPL CALCULATED.3IONS-SCNC: 4 MMOL/L (ref 5–15)
APPEARANCE UR: (no result)
AST SERPL W P-5'-P-CCNC: 21 U/L (ref 10–37)
BACTERIA URNS QL MICRO: (no result) /HPF
BASOPHILS # BLD AUTO: 0 K/UL (ref 0–0.2)
BASOPHILS NFR BLD AUTO: 1 % (ref 0–2)
BILIRUB SERPL-MCNC: 0.4 MG/DL (ref 0–1)
BILIRUB UR QL STRIP: NEGATIVE
BUN SERPL-MCNC: 13 MG/DL (ref 8–21)
CALCIUM SERPL-MCNC: 10.3 MG/DL (ref 8.4–11)
CHLORIDE SERPL-SCNC: 92 MMOL/L (ref 98–107)
COLOR UR: YELLOW
CREAT SERPL-MCNC: 0.82 MG/DL (ref 0.55–1.3)
EOSINOPHIL # BLD AUTO: 0 K/UL (ref 0–0.4)
EOSINOPHIL NFR BLD AUTO: 0.4 % (ref 0–4)
ERYTHROCYTE [DISTWIDTH] IN BLOOD BY AUTOMATED COUNT: 13.6 % (ref 9–15)
GFR SERPL CREATININE-BSD FRML MDRD: (no result) ML/MIN (ref 90–?)
GLUCOSE SERPL-MCNC: 99 MG/DL (ref 70–99)
GLUCOSE UR STRIP-MCNC: NEGATIVE MG/DL
HCT VFR BLD AUTO: 39.9 % (ref 36–48)
HGB UR QL STRIP: (no result)
KETONES UR STRIP-MCNC: NEGATIVE MG/DL
LEUKOCYTE ESTERASE UR QL STRIP: (no result)
LYMPHOCYTES # BLD AUTO: 0.8 K/UL (ref 1–5.5)
LYMPHOCYTES NFR BLD AUTO: 22.8 % (ref 20.5–51.5)
MCV RBC AUTO: 81 FL (ref 79–98)
MONOCYTES # BLD MANUAL: 0.2 K/UL (ref 0–1)
MONOCYTES # BLD MANUAL: 6.3 % (ref 1.7–9.3)
MUCOUS THREADS URNS QL MICRO: (no result) /LPF
NEUTROPHILS # BLD AUTO: 2.6 K/UL (ref 1.8–7.7)
NEUTROPHILS NFR BLD AUTO: 69.5 % (ref 40–70)
NITRITE UR QL STRIP: NEGATIVE
PH UR STRIP: 7.5 [PH] (ref 5–8)
PLATELET # BLD AUTO: 254 K/UL (ref 130–430)
POTASSIUM SERPL-SCNC: 3.2 MMOL/L (ref 3.5–5.1)
PROT UR QL STRIP: NEGATIVE
RBC # BLD AUTO: 4.91 MIL/UL (ref 4.2–6.2)
RBC #/AREA URNS HPF: (no result) /HPF (ref 0–3)
SP GR UR STRIP: 1.01 (ref 1–1.03)
UROBILINOGEN UR STRIP-MCNC: 0.2 MG/DL (ref 0.2–1)
WBC # BLD AUTO: 3.7 K/UL (ref 4.8–10.8)
WBC #/AREA URNS HPF: (no result) /HPF (ref 0–3)

## 2022-09-22 PROCEDURE — G0378 HOSPITAL OBSERVATION PER HR: HCPCS

## 2022-09-22 PROCEDURE — C9113 INJ PANTOPRAZOLE SODIUM, VIA: HCPCS

## 2022-09-22 NOTE — NUR
Report given to Ashu MAXWELL to assume all care. All questions and concerns 
addressed at this time.

## 2022-09-22 NOTE — NUR
Admit bed requested 



Patient will be admitted to care of .  

Admitted to tele unit.

Diagnosis uro-sepsis

Inpatient (Yes or No)  yes

Observation (Yes or No) yes

Orientation concerns or request close to nursing station  (Yes or No) no

Covid Status negative

On vent or bipap no

Isolation requirements none

Needs a sitter no

From Home (Yes or if No enter name of facility) yes

Requires Dialysis (Yes or No) no 

Med Rec Completed (Yes of No) yes

## 2022-09-22 NOTE — NUR
patient has been transported to telemetry unit. 2 RN transport with monitor. 
VSS. rteport given bedside to eric.

## 2022-09-22 NOTE — NUR
Pt from home with c/o general weakness for 2 days, unable to eat due to GERD, 
and HA. Pt also reports that her "vagina falls out when she stands up." Pt A&O 
X3, following commands but slow movement with extremities. Pt VSS, and 
connected to monitor.

## 2022-09-22 NOTE — NUR
CLOSING NOTE

Patient laying in bed resting. A/O x 4, english speaking. No notable signs of pain, SOB, or 
distress. Patient has IV to LAC 20 g Patent and on infusion pump. Call light is within 
reach, all needs met, bed is locked in lowest position. Will endorse to nightshift nurse.

## 2022-09-22 NOTE — NUR
Patient will be admitted to care of [facility].  Admitted to Telemetry unit.  Will 
go to room 120B.  Belongings list completed.  Complete and up to date summary 
report printed. SBAR report to be given at bedside with opportunity for 
questions.

## 2022-09-22 NOTE — NUR
Patient Round.

Patient resting in bed. No pain, no SOB, no distress. All needs met at this time. Call light 
within reach,  bed locked in lowest position. Paged MD Peace for further orders. Will 
continue to monitor.

## 2022-09-22 NOTE — NUR
Admission Note

Received patient from ER with diagnosis of Urosepsis. Initial Plan of Care discussed-patient 
verbalized understanding.  Oriented to room, call light, pain management and safety.  Will 
continue to monitor.

## 2022-09-23 VITALS — SYSTOLIC BLOOD PRESSURE: 112 MMHG

## 2022-09-23 VITALS — SYSTOLIC BLOOD PRESSURE: 109 MMHG

## 2022-09-23 VITALS — SYSTOLIC BLOOD PRESSURE: 113 MMHG

## 2022-09-23 VITALS — SYSTOLIC BLOOD PRESSURE: 143 MMHG

## 2022-09-23 RX ADMIN — DOCUSATE SODIUM SCH MG: 250 CAPSULE, LIQUID FILLED ORAL at 20:46

## 2022-09-23 RX ADMIN — SIMETHICONE CHEW TAB 80 MG SCH MG: 80 TABLET ORAL at 12:32

## 2022-09-23 RX ADMIN — DEXTROSE MONOHYDRATE SCH MLS/HR: 50 INJECTION, SOLUTION INTRAVENOUS at 08:44

## 2022-09-23 RX ADMIN — HYDROCORTISONE SCH GM: 25 CREAM TOPICAL at 20:47

## 2022-09-23 RX ADMIN — SIMETHICONE CHEW TAB 80 MG SCH MG: 80 TABLET ORAL at 18:57

## 2022-09-23 NOTE — NUR
DR. DELONG

CALLED AND TALKED TO DR. DELONG, PATIENT WANTS SLEEPING PILL. NEW ORDER FOR MELATONIN 5 MG 
GIVEN PRN FOR SLEEP. WILL CONTINUE TO MONITOR.

## 2022-09-23 NOTE — NUR
Called MD



Pt's K+ was 3.2 yesterday and no lab ordered for the morning.  Called and spoke with Dr. Peace, and received order for Labs.  Will carry out.

## 2022-09-24 VITALS — SYSTOLIC BLOOD PRESSURE: 165 MMHG

## 2022-09-24 VITALS — SYSTOLIC BLOOD PRESSURE: 162 MMHG

## 2022-09-24 VITALS — SYSTOLIC BLOOD PRESSURE: 114 MMHG

## 2022-09-24 VITALS — SYSTOLIC BLOOD PRESSURE: 129 MMHG

## 2022-09-24 VITALS — SYSTOLIC BLOOD PRESSURE: 138 MMHG

## 2022-09-24 LAB
ANION GAP SERPL CALCULATED.3IONS-SCNC: 10 MMOL/L (ref 5–15)
BASOPHILS # BLD AUTO: 0 K/UL (ref 0–0.2)
BASOPHILS NFR BLD AUTO: 1.2 % (ref 0–2)
BUN SERPL-MCNC: 11 MG/DL (ref 8–21)
CALCIUM SERPL-MCNC: 10.1 MG/DL (ref 8.4–11)
CHLORIDE SERPL-SCNC: 91 MMOL/L (ref 98–107)
CREAT SERPL-MCNC: 0.92 MG/DL (ref 0.55–1.3)
EOSINOPHIL # BLD AUTO: 0 K/UL (ref 0–0.4)
EOSINOPHIL NFR BLD AUTO: 0.5 % (ref 0–4)
ERYTHROCYTE [DISTWIDTH] IN BLOOD BY AUTOMATED COUNT: 13.3 % (ref 9–15)
GFR SERPL CREATININE-BSD FRML MDRD: (no result) ML/MIN (ref 90–?)
GLUCOSE SERPL-MCNC: 91 MG/DL (ref 70–99)
HCT VFR BLD AUTO: 38.7 % (ref 36–48)
LYMPHOCYTES # BLD AUTO: 0.8 K/UL (ref 1–5.5)
LYMPHOCYTES NFR BLD AUTO: 23.3 % (ref 20.5–51.5)
MCV RBC AUTO: 81 FL (ref 79–98)
MONOCYTES # BLD MANUAL: 0.3 K/UL (ref 0–1)
MONOCYTES # BLD MANUAL: 7.6 % (ref 1.7–9.3)
NEUTROPHILS # BLD AUTO: 2.4 K/UL (ref 1.8–7.7)
NEUTROPHILS NFR BLD AUTO: 67.4 % (ref 40–70)
PLATELET # BLD AUTO: 234 K/UL (ref 130–430)
POTASSIUM SERPL-SCNC: 2.8 MMOL/L (ref 3.5–5.1)
RBC # BLD AUTO: 4.77 MIL/UL (ref 4.2–6.2)
WBC # BLD AUTO: 3.6 K/UL (ref 4.8–10.8)

## 2022-09-24 RX ADMIN — SODIUM CHLORIDE SCH MLS/HR: 9 INJECTION, SOLUTION INTRAVENOUS at 09:00

## 2022-09-24 RX ADMIN — DOCUSATE SODIUM SCH MG: 250 CAPSULE, LIQUID FILLED ORAL at 21:14

## 2022-09-24 RX ADMIN — HYDROCORTISONE SCH GM: 25 CREAM TOPICAL at 09:39

## 2022-09-24 RX ADMIN — PANTOPRAZOLE SODIUM SCH MG: 40 TABLET, DELAYED RELEASE ORAL at 09:36

## 2022-09-24 RX ADMIN — SIMETHICONE CHEW TAB 80 MG SCH MG: 80 TABLET ORAL at 12:35

## 2022-09-24 RX ADMIN — POTASSIUM CHLORIDE SCH MLS/HR: 200 INJECTION, SOLUTION INTRAVENOUS at 13:00

## 2022-09-24 RX ADMIN — SIMETHICONE CHEW TAB 80 MG SCH MG: 80 TABLET ORAL at 18:29

## 2022-09-24 RX ADMIN — POTASSIUM CHLORIDE SCH MLS/HR: 200 INJECTION, SOLUTION INTRAVENOUS at 15:34

## 2022-09-24 RX ADMIN — DEXTROSE MONOHYDRATE SCH MLS/HR: 50 INJECTION, SOLUTION INTRAVENOUS at 09:35

## 2022-09-24 RX ADMIN — DOCUSATE SODIUM SCH MG: 250 CAPSULE, LIQUID FILLED ORAL at 09:38

## 2022-09-24 RX ADMIN — SODIUM CHLORIDE SCH MLS/HR: 9 INJECTION, SOLUTION INTRAVENOUS at 15:34

## 2022-09-24 RX ADMIN — SENNOSIDES SCH TAB: 8.6 TABLET, COATED ORAL at 09:37

## 2022-09-24 RX ADMIN — SIMETHICONE CHEW TAB 80 MG SCH MG: 80 TABLET ORAL at 09:36

## 2022-09-24 RX ADMIN — HYDROCORTISONE SCH GM: 25 CREAM TOPICAL at 21:20

## 2022-09-24 NOTE — NUR
Dietitian Recommendations



* [facility], Glucerna BID, Matthias BID

(supplements yield an additional 620 kcal/day, 25 gm protein/day)

* Encourage PO intakes

* Consider appetite stimulant

LP, MS, RD



Please refer to Nutrition Assessment for details.

-------------------------------------------------------------------------------

Addendum: 09/24/22 at 1745 by Belem Gordon RD

-------------------------------------------------------------------------------

Amended: Links added.

## 2022-09-24 NOTE — NUR
Received patient aao x 3, vital signs within normal limits, patient a bit anxious, meds 
given as ordered, inform to call for help as needed, patient able to use bedside commode, 
denies pain as of now, in no acute distress, call light placed to reach.

## 2022-09-24 NOTE — NUR
Patient informed that her stool is hard and kimi, inform that colace and senna was given in 
the morning to help with constipation, patient wants the Doctor to know about her stool, 
inform Doctor is aware and the meds given will eventually take care of the problem. Will 
continue to make patient comfortable.

## 2022-09-24 NOTE — NUR
Rounds/SW consult



Pt alert, awake, no s/s distress noted.  Pt verbalized feeling anxious and that she needs 
help getting grocery at home because when her  gets grocery she states he gets only 
one thing and the wrong thing.  Placed SW consult and Dietary.  Reassurance provided as 
needed.  Will endorse to AM nurse re anxiety medication.  Safety maintained.

## 2022-09-24 NOTE — NUR
Patient is in bed watching TV, IN NO ACUTE DISTRESS, PATIENT ABLE TO MAKE NEEDS KNOWN. CALL 
LIGHT PLACED TO REACH.

## 2022-09-24 NOTE — NUR
CONSULTATION PAGED/CALLED

Reason for Consultation: WEIGHT LOSS

Person Who was Notified: CAM 

Consulting Physician: ELLIE PARMAR; DR CASTILLO IS ON CALL 

Ordering Physician:ELEN GARRISON

## 2022-09-25 VITALS — SYSTOLIC BLOOD PRESSURE: 135 MMHG

## 2022-09-25 VITALS — SYSTOLIC BLOOD PRESSURE: 133 MMHG

## 2022-09-25 VITALS — SYSTOLIC BLOOD PRESSURE: 140 MMHG

## 2022-09-25 VITALS — SYSTOLIC BLOOD PRESSURE: 144 MMHG

## 2022-09-25 VITALS — SYSTOLIC BLOOD PRESSURE: 110 MMHG

## 2022-09-25 LAB
ANION GAP SERPL CALCULATED.3IONS-SCNC: 14 MMOL/L (ref 5–15)
BASOPHILS # BLD AUTO: 0 K/UL (ref 0–0.2)
BASOPHILS NFR BLD AUTO: 1.2 % (ref 0–2)
BUN SERPL-MCNC: 9 MG/DL (ref 8–21)
CALCIUM SERPL-MCNC: 9.8 MG/DL (ref 8.4–11)
CHLORIDE SERPL-SCNC: 89 MMOL/L (ref 98–107)
CREAT SERPL-MCNC: 0.84 MG/DL (ref 0.55–1.3)
CRP SERPL-MCNC: < 0.2 MG/DL (ref 0–0.5)
EOSINOPHIL # BLD AUTO: 0 K/UL (ref 0–0.4)
EOSINOPHIL NFR BLD AUTO: 0.2 % (ref 0–4)
ERYTHROCYTE [DISTWIDTH] IN BLOOD BY AUTOMATED COUNT: 13.6 % (ref 9–15)
GFR SERPL CREATININE-BSD FRML MDRD: (no result) ML/MIN (ref 90–?)
GLUCOSE SERPL-MCNC: 84 MG/DL (ref 70–99)
HCT VFR BLD AUTO: 38.4 % (ref 36–48)
LYMPHOCYTES # BLD AUTO: 0.7 K/UL (ref 1–5.5)
LYMPHOCYTES NFR BLD AUTO: 23.4 % (ref 20.5–51.5)
MCV RBC AUTO: 82 FL (ref 79–98)
MONOCYTES # BLD MANUAL: 0.3 K/UL (ref 0–1)
MONOCYTES # BLD MANUAL: 8.1 % (ref 1.7–9.3)
NEUTROPHILS # BLD AUTO: 2.1 K/UL (ref 1.8–7.7)
NEUTROPHILS NFR BLD AUTO: 67.1 % (ref 40–70)
PLATELET # BLD AUTO: 246 K/UL (ref 130–430)
POTASSIUM SERPL-SCNC: 3.4 MMOL/L (ref 3.5–5.1)
RBC # BLD AUTO: 4.67 MIL/UL (ref 4.2–6.2)
WBC # BLD AUTO: 3.2 K/UL (ref 4.8–10.8)

## 2022-09-25 RX ADMIN — SENNOSIDES SCH TAB: 8.6 TABLET, COATED ORAL at 08:42

## 2022-09-25 RX ADMIN — SIMETHICONE CHEW TAB 80 MG SCH MG: 80 TABLET ORAL at 08:42

## 2022-09-25 RX ADMIN — HYDROCORTISONE SCH GM: 25 CREAM TOPICAL at 08:54

## 2022-09-25 RX ADMIN — SIMETHICONE CHEW TAB 80 MG SCH MG: 80 TABLET ORAL at 18:30

## 2022-09-25 RX ADMIN — SODIUM CHLORIDE SCH MLS/HR: 9 INJECTION, SOLUTION INTRAVENOUS at 18:46

## 2022-09-25 RX ADMIN — SODIUM CHLORIDE SCH MLS/HR: 9 INJECTION, SOLUTION INTRAVENOUS at 08:45

## 2022-09-25 RX ADMIN — DEXTROSE MONOHYDRATE SCH MLS/HR: 50 INJECTION, SOLUTION INTRAVENOUS at 08:53

## 2022-09-25 RX ADMIN — DOCUSATE SODIUM SCH MG: 250 CAPSULE, LIQUID FILLED ORAL at 20:38

## 2022-09-25 RX ADMIN — SIMETHICONE CHEW TAB 80 MG SCH MG: 80 TABLET ORAL at 12:50

## 2022-09-25 RX ADMIN — HYDROCORTISONE SCH GM: 25 CREAM TOPICAL at 20:38

## 2022-09-25 RX ADMIN — DOCUSATE SODIUM SCH MG: 250 CAPSULE, LIQUID FILLED ORAL at 08:41

## 2022-09-25 RX ADMIN — PANTOPRAZOLE SODIUM SCH MG: 40 TABLET, DELAYED RELEASE ORAL at 08:42

## 2022-09-25 NOTE — NUR
ROUNDING

PATIENT AWAKE IN BED, ASK FOR APPLE JUICE, LINEN/ GOWN CHANGED, NO C/O PAIN OR SOB. CONTINUE 
TO MONITOR.

## 2022-09-25 NOTE — NUR
Notes/BM- Had big bowel movement on the commode. Patient has good appetite and keep asking 
for snacks, But still complaining of weakness.

## 2022-09-25 NOTE — NUR
INITIAL NOTES

RECEIVED PATIENT A/OX3,VITAL, ABLE TO VERBALIZES NEEDS AND CONCERNS. VITAL SIGNS STABLE, NO 
C/O PAIN/SOB. IV INFUSING LEFT HAND PATENT.CONTINUE TO MONITOR PATIENT AND SAFETY CALL LIGHT 
W/IN REACHED AND BED IN LOW POSITION

## 2022-09-25 NOTE — NUR
CLOSING

PATIENT LAYING COMFORTABLE IN BED WATCHING T.V WITH NO SIGNS OF PAIN, DISTRESS OR SOB. ALL 
NEEDS ARE MET. CALL LIGHT W/IN REACHED BED IN LOW POSITION, WILL ENDORSE TO ONCOMING NURSE.

## 2022-09-26 VITALS — SYSTOLIC BLOOD PRESSURE: 113 MMHG

## 2022-09-26 VITALS — SYSTOLIC BLOOD PRESSURE: 108 MMHG

## 2022-09-26 VITALS — SYSTOLIC BLOOD PRESSURE: 111 MMHG

## 2022-09-26 VITALS — SYSTOLIC BLOOD PRESSURE: 114 MMHG

## 2022-09-26 VITALS — SYSTOLIC BLOOD PRESSURE: 112 MMHG

## 2022-09-26 LAB
ALBUMIN SERPL BCP-MCNC: 3.1 G/DL (ref 3.4–4.8)
ALT SERPL W P-5'-P-CCNC: 15 U/L (ref 12–78)
ANION GAP SERPL CALCULATED.3IONS-SCNC: 7 MMOL/L (ref 5–15)
AST SERPL W P-5'-P-CCNC: 16 U/L (ref 10–37)
BASOPHILS # BLD AUTO: 0 K/UL (ref 0–0.2)
BASOPHILS NFR BLD AUTO: 0.8 % (ref 0–2)
BILIRUB SERPL-MCNC: 0.4 MG/DL (ref 0–1)
BUN SERPL-MCNC: 9 MG/DL (ref 8–21)
CALCIUM SERPL-MCNC: 9.4 MG/DL (ref 8.4–11)
CHLORIDE SERPL-SCNC: 95 MMOL/L (ref 98–107)
CREAT SERPL-MCNC: 0.79 MG/DL (ref 0.55–1.3)
CRP SERPL-MCNC: < 0.2 MG/DL (ref 0–0.5)
EOSINOPHIL # BLD AUTO: 0 K/UL (ref 0–0.4)
EOSINOPHIL NFR BLD AUTO: 0.2 % (ref 0–4)
ERYTHROCYTE [DISTWIDTH] IN BLOOD BY AUTOMATED COUNT: 13.5 % (ref 9–15)
ERYTHROCYTE [SEDIMENTATION RATE] IN BLOOD BY WESTERGREN METHOD: 2 MM/HR (ref 0–20)
ERYTHROCYTE [SEDIMENTATION RATE] IN BLOOD BY WESTERGREN METHOD: 3 MM/HR (ref 0–20)
GFR SERPL CREATININE-BSD FRML MDRD: (no result) ML/MIN (ref 90–?)
GLUCOSE SERPL-MCNC: 101 MG/DL (ref 70–99)
HCT VFR BLD AUTO: 37.3 % (ref 36–48)
LYMPHOCYTES # BLD AUTO: 0.9 K/UL (ref 1–5.5)
LYMPHOCYTES NFR BLD AUTO: 24.7 % (ref 20.5–51.5)
MCV RBC AUTO: 81 FL (ref 79–98)
MONOCYTES # BLD MANUAL: 0.3 K/UL (ref 0–1)
MONOCYTES # BLD MANUAL: 7.6 % (ref 1.7–9.3)
NEUTROPHILS # BLD AUTO: 2.4 K/UL (ref 1.8–7.7)
NEUTROPHILS NFR BLD AUTO: 66.7 % (ref 40–70)
PLATELET # BLD AUTO: 228 K/UL (ref 130–430)
POTASSIUM SERPL-SCNC: 4 MMOL/L (ref 3.5–5.1)
RBC # BLD AUTO: 4.58 MIL/UL (ref 4.2–6.2)
WBC # BLD AUTO: 3.5 K/UL (ref 4.8–10.8)

## 2022-09-26 RX ADMIN — PANTOPRAZOLE SODIUM SCH MG: 40 TABLET, DELAYED RELEASE ORAL at 09:04

## 2022-09-26 RX ADMIN — SIMETHICONE CHEW TAB 80 MG SCH MG: 80 TABLET ORAL at 17:45

## 2022-09-26 RX ADMIN — SODIUM CHLORIDE SCH MLS/HR: 9 INJECTION, SOLUTION INTRAVENOUS at 06:07

## 2022-09-26 RX ADMIN — SODIUM CHLORIDE SCH MLS/HR: 9 INJECTION, SOLUTION INTRAVENOUS at 14:45

## 2022-09-26 RX ADMIN — DOCUSATE SODIUM SCH MG: 250 CAPSULE, LIQUID FILLED ORAL at 21:04

## 2022-09-26 RX ADMIN — HYDROCORTISONE SCH GM: 25 CREAM TOPICAL at 21:05

## 2022-09-26 RX ADMIN — DOCUSATE SODIUM SCH MG: 250 CAPSULE, LIQUID FILLED ORAL at 09:04

## 2022-09-26 RX ADMIN — HYDROCORTISONE SCH GM: 25 CREAM TOPICAL at 09:05

## 2022-09-26 RX ADMIN — SIMETHICONE CHEW TAB 80 MG SCH MG: 80 TABLET ORAL at 11:55

## 2022-09-26 RX ADMIN — SIMETHICONE CHEW TAB 80 MG SCH MG: 80 TABLET ORAL at 09:04

## 2022-09-26 RX ADMIN — DEXTROSE MONOHYDRATE SCH MLS/HR: 50 INJECTION, SOLUTION INTRAVENOUS at 09:05

## 2022-09-26 RX ADMIN — SENNOSIDES SCH TAB: 8.6 TABLET, COATED ORAL at 09:04

## 2022-09-26 NOTE — NUR
MSW recived a referral to see pt. and assist with groceries. 

MSW spoke to Rn Conchita who stated pt. is just eating applesauce and applejuice. 

MSW introduced self to pt. who was slowly walking around her bed. Pt. said she wanted to get 
up to strech her  legs, but feels weak. Pt. sat on the edge of the bed during the interview. 




Pt. said she had been at her home walking around and felt very weak. She said she felt her 
legs give a little and called the ambulance. Pt. said she is still experiencing the same 
symptoms, weak, acid reflux, constipation, low potassium. MSW reminded her that she is under 
a doctors care and they will assess her medical needs. Pt. said she had two sons. One lives 
in [place] and he is moving to live in [place] in October and her other son, lives 
nearby but is no help. Pt. stated her  will still go to the grocery store for her, 
but buys food she cant cook and use to prepare a meal. Pt. added her  is handicapped 
and does not do anything. Pt. wanted to walk with the physical therapist, but did want 
assistance with groceries. 



MSW called pts son, Delvin Chun , [phone] who confirmed he resides in [place] and 
will be moving to reside in [place]. Delvin stated he could reach out the local friends 
and pts. [employer] community to assist with meals. MSW also asked Delvin if he could look into 
food delivery such as [employer], [employer], [employer] and [employer]. Delvin said he can do so. MSW 
thanked him for his assistance and will remain available as needed.

## 2022-09-26 NOTE — NUR
CONSULTATION PAGED/CALLED

Reason for Consultation: VAGINAL PROLAPSE

Person Who was Notified: AUSTEN

Consulting Physician: GUERLINE SIMON

Consultant Specialty: 

Ordering Physician: BLADIMIR

## 2022-09-27 VITALS — SYSTOLIC BLOOD PRESSURE: 116 MMHG

## 2022-09-27 VITALS — SYSTOLIC BLOOD PRESSURE: 124 MMHG

## 2022-09-27 VITALS — SYSTOLIC BLOOD PRESSURE: 121 MMHG

## 2022-09-27 VITALS — SYSTOLIC BLOOD PRESSURE: 143 MMHG

## 2022-09-27 VITALS — SYSTOLIC BLOOD PRESSURE: 130 MMHG

## 2022-09-27 RX ADMIN — SODIUM CHLORIDE SCH MLS/HR: 9 INJECTION, SOLUTION INTRAVENOUS at 21:30

## 2022-09-27 RX ADMIN — HYDROCORTISONE SCH GM: 25 CREAM TOPICAL at 09:21

## 2022-09-27 RX ADMIN — SODIUM CHLORIDE SCH MLS/HR: 9 INJECTION, SOLUTION INTRAVENOUS at 09:34

## 2022-09-27 RX ADMIN — DOCUSATE SODIUM SCH MG: 250 CAPSULE, LIQUID FILLED ORAL at 09:21

## 2022-09-27 RX ADMIN — SIMETHICONE CHEW TAB 80 MG SCH MG: 80 TABLET ORAL at 21:28

## 2022-09-27 RX ADMIN — SIMETHICONE CHEW TAB 80 MG SCH MG: 80 TABLET ORAL at 09:21

## 2022-09-27 RX ADMIN — SENNOSIDES SCH TAB: 8.6 TABLET, COATED ORAL at 09:21

## 2022-09-27 RX ADMIN — DOCUSATE SODIUM SCH MG: 250 CAPSULE, LIQUID FILLED ORAL at 21:28

## 2022-09-27 RX ADMIN — DEXTROSE MONOHYDRATE SCH MLS/HR: 50 INJECTION, SOLUTION INTRAVENOUS at 09:30

## 2022-09-27 RX ADMIN — SODIUM CHLORIDE SCH MLS/HR: 9 INJECTION, SOLUTION INTRAVENOUS at 00:46

## 2022-09-27 RX ADMIN — SIMETHICONE CHEW TAB 80 MG SCH MG: 80 TABLET ORAL at 14:59

## 2022-09-27 RX ADMIN — PANTOPRAZOLE SODIUM SCH MG: 40 TABLET, DELAYED RELEASE ORAL at 09:21

## 2022-09-27 RX ADMIN — HYDROCORTISONE SCH GM: 25 CREAM TOPICAL at 21:31

## 2022-09-27 NOTE — NUR
DISCHARGE PLANNING

Discussed with Dr Peace in [facility] [place] [facility] station, plan for home with home health & fww. Spoke with pt 
at bedside, states already has a fww at home does not need one. Is agreeable with home 
health with no preference. [facility] Planner faxed to [facility]. 

-------------------------------------------------------------------------------

Addendum: 09/27/22 at 1413 by Nancy Reyes RN

-------------------------------------------------------------------------------

Called & spoke with Luci at [facility], ph [phone], states did receive and Fannie is the dc 
planner working on it. Called & lt msg with Fannie at [facility], ph [phone].

-------------------------------------------------------------------------------

Addendum: 09/27/22 at 1610 by Debra Garcia DP

-------------------------------------------------------------------------------

[place] at [facility] [phone] called [facility] [phone] accepted pt

## 2022-09-28 VITALS — SYSTOLIC BLOOD PRESSURE: 136 MMHG

## 2022-09-28 VITALS — SYSTOLIC BLOOD PRESSURE: 108 MMHG

## 2022-09-28 VITALS — SYSTOLIC BLOOD PRESSURE: 129 MMHG

## 2022-09-28 VITALS — SYSTOLIC BLOOD PRESSURE: 132 MMHG

## 2022-09-28 RX ADMIN — SIMETHICONE CHEW TAB 80 MG SCH MG: 80 TABLET ORAL at 11:49

## 2022-09-28 RX ADMIN — SIMETHICONE CHEW TAB 80 MG SCH MG: 80 TABLET ORAL at 08:41

## 2022-09-28 RX ADMIN — PANTOPRAZOLE SODIUM SCH MG: 40 TABLET, DELAYED RELEASE ORAL at 08:42

## 2022-09-28 RX ADMIN — DOCUSATE SODIUM SCH MG: 250 CAPSULE, LIQUID FILLED ORAL at 08:43

## 2022-09-28 RX ADMIN — SENNOSIDES SCH TAB: 8.6 TABLET, COATED ORAL at 08:43

## 2022-09-28 RX ADMIN — SODIUM CHLORIDE SCH MLS/HR: 9 INJECTION, SOLUTION INTRAVENOUS at 05:44

## 2022-09-28 RX ADMIN — HYDROCORTISONE SCH GM: 25 CREAM TOPICAL at 08:44

## 2022-09-28 RX ADMIN — DEXTROSE MONOHYDRATE SCH MLS/HR: 50 INJECTION, SOLUTION INTRAVENOUS at 08:42

## 2022-09-28 NOTE — NUR
D/C Patient

Patient given medication reconciliation form and D/C instructions. Exit Care provided. 
Patient verbalized understanding. MD discussed with patient the results and treatment 
provided. Ambulatory with steady gait for discharge to home. Patient in stable condition, ID 
band removed. IV catheter removed, intact and dressing applied, no active bleeding. Rx of  
given. Patient educated on pain management. All belongings sent with patient.

## 2022-09-28 NOTE — NUR
***** PHYSICAL THERAPY CO-SIGN *****

The Physical Therapy Progress Notes documented by Physical Therapy Assistant have been 
reviewed.



Reviewed/Co-Signed by:  Bennie Gonzales

Documentation Done by:  LILLIAN GARCIA PTA

-------------------------------------------------------------------------------

Addendum: 09/28/22 at 0742 by Bennie Gonzales PT

-------------------------------------------------------------------------------

Amended: Links added.